# Patient Record
Sex: MALE | Race: WHITE | Employment: UNEMPLOYED | URBAN - NONMETROPOLITAN AREA
[De-identification: names, ages, dates, MRNs, and addresses within clinical notes are randomized per-mention and may not be internally consistent; named-entity substitution may affect disease eponyms.]

---

## 2017-01-01 ENCOUNTER — COMMUNICATION - GICH (OUTPATIENT)
Dept: INTERNAL MEDICINE | Facility: OTHER | Age: 62
End: 2017-01-01

## 2017-01-01 ENCOUNTER — AMBULATORY - GICH (OUTPATIENT)
Dept: SCHEDULING | Facility: OTHER | Age: 62
End: 2017-01-01

## 2017-01-01 ENCOUNTER — HOSPITAL - GICH (OUTPATIENT)
Dept: LAB | Facility: OTHER | Age: 62
End: 2017-01-01

## 2017-01-01 ENCOUNTER — ANTICOAGULATION - GICH (OUTPATIENT)
Dept: INTERNAL MEDICINE | Facility: OTHER | Age: 62
End: 2017-01-01

## 2017-01-01 ENCOUNTER — HISTORY (OUTPATIENT)
Dept: EMERGENCY MEDICINE | Facility: OTHER | Age: 62
End: 2017-01-01

## 2017-01-01 ENCOUNTER — HISTORY (OUTPATIENT)
Dept: INTERNAL MEDICINE | Facility: OTHER | Age: 62
End: 2017-01-01

## 2017-01-01 ENCOUNTER — AMBULATORY - GICH (OUTPATIENT)
Dept: INTERNAL MEDICINE | Facility: OTHER | Age: 62
End: 2017-01-01

## 2017-01-01 ENCOUNTER — OFFICE VISIT - GICH (OUTPATIENT)
Dept: INTERNAL MEDICINE | Facility: OTHER | Age: 62
End: 2017-01-01

## 2017-01-01 ENCOUNTER — AMBULATORY - GICH (OUTPATIENT)
Dept: GERIATRICS | Facility: OTHER | Age: 62
End: 2017-01-01

## 2017-01-01 DIAGNOSIS — Z79.01 LONG TERM CURRENT USE OF ANTICOAGULANT: ICD-10-CM

## 2017-01-01 DIAGNOSIS — D68.9 COAGULATION DEFECT (H): ICD-10-CM

## 2017-01-01 DIAGNOSIS — I48.20 CHRONIC ATRIAL FIBRILLATION (H): ICD-10-CM

## 2017-01-01 DIAGNOSIS — N20.0 CALCULUS OF KIDNEY: ICD-10-CM

## 2017-01-01 DIAGNOSIS — E78.5 HYPERLIPIDEMIA: ICD-10-CM

## 2017-01-01 DIAGNOSIS — Z79.4 LONG TERM CURRENT USE OF INSULIN (H): ICD-10-CM

## 2017-01-01 DIAGNOSIS — I48.0 PAROXYSMAL ATRIAL FIBRILLATION (H): ICD-10-CM

## 2017-01-01 DIAGNOSIS — R82.90 ABNORMAL FINDING IN URINE: ICD-10-CM

## 2017-01-01 DIAGNOSIS — J44.9 CHRONIC OBSTRUCTIVE PULMONARY DISEASE (H): ICD-10-CM

## 2017-01-01 DIAGNOSIS — I50.42 CHRONIC COMBINED SYSTOLIC AND DIASTOLIC HEART FAILURE (H): ICD-10-CM

## 2017-01-01 DIAGNOSIS — I10 ESSENTIAL (PRIMARY) HYPERTENSION: ICD-10-CM

## 2017-01-01 DIAGNOSIS — R53.1 WEAKNESS: ICD-10-CM

## 2017-01-01 DIAGNOSIS — G89.4 CHRONIC PAIN SYNDROME: ICD-10-CM

## 2017-01-01 DIAGNOSIS — D64.9 ANEMIA: ICD-10-CM

## 2017-01-01 DIAGNOSIS — E11.9 TYPE 2 DIABETES MELLITUS WITHOUT COMPLICATIONS (H): ICD-10-CM

## 2017-01-01 LAB
BACTERIA URINE: ABNORMAL BACTERIA/HPF
BILIRUB UR QL: NEGATIVE
CLARITY, URINE: ABNORMAL CLARITY
COLOR UR: YELLOW COLOR
EPITHELIAL CELLS: ABNORMAL EPI/HPF
ESTIMATED AVERAGE GLUCOSE: 255 MG/DL
GLUCOSE URINE: NEGATIVE MG/DL
HEMOGLOBIN A1C MONITORING (POCT) - HISTORICAL: 10.5 % (ref 4–6.2)
INR - HISTORICAL: 1
INR - HISTORICAL: 1.3
INR - HISTORICAL: 1.4
INR - HISTORICAL: 1.6
INR - HISTORICAL: 1.7
INR - HISTORICAL: 1.9
INR - HISTORICAL: 2.1
INR - HISTORICAL: 3.2
KETONES UR QL: NEGATIVE MG/DL
LEUKOCYTE ESTERASE URINE: ABNORMAL
NITRITE UR QL STRIP: NEGATIVE
OCCULT BLOOD,URINE - HISTORICAL: ABNORMAL
PH UR: 5 [PH]
PROTEIN QUALITATIVE,URINE - HISTORICAL: 100 MG/DL
PROTIME - HISTORICAL: 10.6 SEC (ref 11.9–15.2)
PROTIME - HISTORICAL: 13.4 SEC (ref 11.9–15.2)
PROTIME - HISTORICAL: 14.7 SEC (ref 11.9–15.2)
PROTIME - HISTORICAL: 17.2 SEC (ref 11.9–15.2)
PROTIME - HISTORICAL: 18 SEC (ref 11.9–15.2)
PROTIME - HISTORICAL: 20.3 SEC (ref 11.9–15.2)
PROTIME - HISTORICAL: 22.9 SEC (ref 11.9–15.2)
PROTIME - HISTORICAL: 35.7 SEC (ref 11.9–15.2)
RBC - HISTORICAL: ABNORMAL /HPF
SP GR UR STRIP: >=1.03
UROBILINOGEN,QUALITATIVE - HISTORICAL: NORMAL EU/DL
VITAMIN D TOTAL - HISTORICAL: 12 NG/ML
WBC - HISTORICAL: >100 /HPF
YEAST - HISTORICAL: ABNORMAL

## 2017-01-01 ASSESSMENT — PATIENT HEALTH QUESTIONNAIRE - PHQ9: SUM OF ALL RESPONSES TO PHQ QUESTIONS 1-9: 10

## 2017-01-01 ASSESSMENT — ANXIETY QUESTIONNAIRES
3. WORRYING TOO MUCH ABOUT DIFFERENT THINGS: SEVERAL DAYS
5. BEING SO RESTLESS THAT IT IS HARD TO SIT STILL: NOT AT ALL
GAD7 TOTAL SCORE: 2
7. FEELING AFRAID AS IF SOMETHING AWFUL MIGHT HAPPEN: NOT AT ALL
6. BECOMING EASILY ANNOYED OR IRRITABLE: SEVERAL DAYS
2. NOT BEING ABLE TO STOP OR CONTROL WORRYING: NOT AT ALL
1. FEELING NERVOUS, ANXIOUS, OR ON EDGE: NOT AT ALL
4. TROUBLE RELAXING: NOT AT ALL

## 2017-12-28 NOTE — TELEPHONE ENCOUNTER
Patient Information     Patient Name MRN Kole Lanad [id number] Male 1955      Telephone Encounter by Damari Keith RN at 2017  1:15 PM     Author:  Damari Keith RN Service:  (none) Author Type:  NURS- Registered Nurse     Filed:  2017  1:19 PM Encounter Date:  2017 Status:  Signed     :  Damari Keith RN (NURS- Registered Nurse)            Patient was just admitted to [facility] for rehab after being d/c from [facility] for COPD acute exacerbation. He is on warfarin for chronic afib. Patient assigned Dr Garcia who is not here today but we need orders for protime to manage patients INR's. Would you please review and sign orders. They are attached. Damari Keith RN    2017  1:18 PM

## 2017-12-28 NOTE — PROGRESS NOTES
Patient Information     Patient Name MRN Sex Kole Ortiz [id number] Male 1955      Progress Notes by Felecia Estrada NP at 2017  3:20 AM     Author:  Felecia Estrada NP Service:  (none) Author Type:  PHYS- Nurse Practitioner     Filed:  2017  7:27 AM Encounter Date:  2017 Status:  Signed     :  Felecia Estrada NP (PHYS- Nurse Practitioner)            This note has been dictated. The encounter number is [id number].

## 2017-12-28 NOTE — PATIENT INSTRUCTIONS
Patient Information     Patient Name MRN Sex Kole Ortiz [id number] Male 1955      Patient Instructions by Damari Keith RN at 2017  9:13 AM     Author:  Damari Keith RN Service:  (none) Author Type:  NURS- Registered Nurse     Filed:  2017 12:33 PM Encounter Date:  2017 Status:  Signed     :  Damari Keith RN (NURS- Registered Nurse)            2017 Details    Sun  Thu Fri Sat           1                 2               3               4               5               6               7               8                 9               10               11               12               13      6 mg   See details      14      4 mg         15      6 mg           16      6 mg         17      4 mg         18      6 mg         19      6 mg         20               21               22                 23               24               25               26               27               28               29                 30               31                     Date Details    This INR check       Date of next INR:  2017         How to take your warfarin dose     To take:  4 mg Take two of the 2 mg tablets.    To take:  6 mg Take three of the 2 mg tablets.             Description          Take 6 mg x 4 days and 4 mg x 2 days and recheck on 17. Damari Keith RN    2017  12:31 PM          Anticoagulation Summary as of 2017     INR goal 2.0-3.0    Today's INR 1.9!    Next INR check 2017          Call your Anticoagulation Clinic at Dept: [phone]   if:   1. Any medications are started, stopped, or there is a change in dose.  2. You experience any bleeding that is not easily stopped or if it is recurrent.  3. You notice an increase in bruising or any bruising that does not heal.  4. You are scheduled for surgery, colonoscopy, dental extraction or any other procedure where you may need to stop your Coumadin (warfarin).  Patient not  here, Protime Communication sheet with screening questions and current INR received via FAX from outside agency. Results reviewed, Warfarin dosing per protocol, and recommended follow-up appointment made. Paperwork FAXED back to facility.

## 2017-12-28 NOTE — PATIENT INSTRUCTIONS
Patient Information     Patient Name MRN Kole Landa [id number] Male 1955      Patient Instructions by Erica Sandra RN at 2017 10:53 AM     Author:  Erica Sandra RN Service:  (none) Author Type:  NURS- Registered Nurse     Filed:  2017 11:00 AM Encounter Date:  2017 Status:  Signed     :  Erica Sandra RN (NURS- Registered Nurse)            2017 Details    Sun  Sat         1               2               3                 4               5               6               7               8               9               10                 11               12               13               14               15               16               17                 18               19               20               21               22               23               24                 25               26      3 mg   See details      27      4 mg         28      3 mg         29      4 mg         30                 Date Details    This INR check       Date of next INR:  2017         How to take your warfarin dose     To take:  3 mg Take one and a half of the 2 mg tablets.    To take:  4 mg Take two of the 2 mg tablets.             Description          Take 3 mg Monday and Wednesday and 4 mg Tuesday and Thursday.  Recheck on 17.  Erica Sandra RN 2017 10:58 AM             Anticoagulation Summary as of 2017     INR goal 2.0-3.0    Today's INR 1.4!    Next INR check 2017          Call your Anticoagulation Clinic at Dept: [phone]   if:   1. Any medications are started, stopped, or there is a change in dose.  2. You experience any bleeding that is not easily stopped or if it is recurrent.  3. You notice an increase in bruising or any bruising that does not heal.  4. You are scheduled for surgery, colonoscopy, dental extraction or any other procedure where you may need to stop your Coumadin (warfarin).  Patient  not here, Protime Communication sheet with screening questions and current INR received via FAX from outside agency. Results reviewed, Warfarin dosing per protocol, and recommended follow-up appointment made. Paperwork FAXED back to facility.

## 2017-12-28 NOTE — TELEPHONE ENCOUNTER
Patient Information     Patient Name MRN Sex Kole Ortiz [id number] Male 1955      Telephone Encounter by Jillian Foster at 2017  1:45 PM     Author:  Jillian Foster Service:  (none) Author Type:  (none)     Filed:  2017  1:47 PM Encounter Date:  2017 Status:  Signed     :  Jillian Foster            Spoke to the nurse at [facility] regarding the insurance requirements for DME orders.  They are aware of this and will let the patient know he has to be seen and evaluated for this; nothing can be done over the phone.  Jillian Foster CMA (AAMA)................ 2017 1:46 PM

## 2017-12-28 NOTE — PROGRESS NOTES
Patient Information     Patient Name MRN Sex Kole Ortiz [id number] Male 1955      Progress Notes by Melany Garcia DO at 7/10/2017  1:40 PM     Author:  Melany Garcia DO Service:  (none) Author Type:  PHYS- Osteopathic     Filed:  2017  7:40 AM Encounter Date:  7/10/2017 Status:  Signed     :  Melany Garcia DO (PHYS- Osteopathic)            Please see H&P from same date.

## 2017-12-29 NOTE — PATIENT INSTRUCTIONS
Patient Information     Patient Name MRN Kole Landa [id number] Male 1955      Patient Instructions by Damari Keith RN at 7/3/2017  8:58 AM     Author:  Damari Keith RN Service:  (none) Author Type:  NURS- Registered Nurse     Filed:  7/3/2017 10:31 AM Encounter Date:  7/3/2017 Status:  Signed     :  Damari Keith RN (NURS- Registered Nurse)            2017 Details    Sun Mon e Wed Thu Fri Sat           1                 2               3      6 mg   See details      4      6 mg         5      4 mg         6               7               8                 9               10               11               12               13               14               15                 16               17               18               19               20               21               22                 23               24               25               26               27               28               29                 30               31                     Date Details    This INR check       Date of next INR:  2017         How to take your warfarin dose     To take:  4 mg Take two of the 2 mg tablets.    To take:  6 mg Take three of the 2 mg tablets.             Description          Take 6 mg x 2 days and 4 mg x 1 day recheck on 17. Damari Keith RN    7/3/2017  10:25 AM               Anticoagulation Summary as of 7/3/2017     INR goal 2.0-3.0    Today's INR 1.3!    Next INR check 2017          Call your Anticoagulation Clinic at Dept: [phone]   if:   1. Any medications are started, stopped, or there is a change in dose.  2. You experience any bleeding that is not easily stopped or if it is recurrent.  3. You notice an increase in bruising or any bruising that does not heal.  4. You are scheduled for surgery, colonoscopy, dental extraction or any other procedure where you may need to stop your Coumadin (warfarin).  Patient not here, Protime  Communication sheet with screening questions and current INR received via FAX from outside agency. Results reviewed, Warfarin dosing per protocol, and recommended follow-up appointment made. Paperwork FAXED back to facility.

## 2017-12-29 NOTE — PATIENT INSTRUCTIONS
Patient Information     Patient Name MRN Sex Kole Ortiz [id number] Male 1955      Patient Instructions by Aleyda Goins RN at 2017 12:13 PM     Author:  Aleyda Goins RN Service:  (none) Author Type:  NURS- Registered Nurse     Filed:  2017  3:52 PM Encounter Date:  2017 Status:  Signed     :  Aleyda Goins RN (NURS- Registered Nurse)            2017 Details    Sun  Thu Fri Sat           1                 2               3               4               5               6      6 mg   See details      7      4 mg         8      6 mg           9      4 mg         10               11               12               13               14               15                 16               17               18               19               20               21               22                 23               24               25               26               27               28               29                 30               31                     Date Details    This INR check       Date of next INR:  7/10/2017         How to take your warfarin dose     To take:  4 mg Take two of the 2 mg tablets.    To take:  6 mg Take three of the 2 mg tablets.             Description          Take 6 mg x two days and 4 mg x two days recheck on 7/10/17.  ALEYDA GOINS RN ....................  2017   3:42 PM          Anticoagulation Summary as of 2017     INR goal 2.0-3.0    Today's INR 1.7!    Next INR check 7/10/2017          Call your Anticoagulation Clinic at Dept: [phone]   if:   1. Any medications are started, stopped, or there is a change in dose.  2. You experience any bleeding that is not easily stopped or if it is recurrent.  3. You notice an increase in bruising or any bruising that does not heal.  4. You are scheduled for surgery, colonoscopy, dental extraction or any other procedure where you may need to stop your Coumadin (warfarin).  Patient  not here, Protime Communication sheet with screening questions and current INR received via FAX from outside agency. Results reviewed, Warfarin dosing per protocol, and recommended follow-up appointment made. Paperwork FAXED back to facility.

## 2017-12-29 NOTE — PATIENT INSTRUCTIONS
Patient Information     Patient Name MRN Sex Kole Ortiz [id number] Male 1955      Patient Instructions by Damari Keith RN at 7/10/2017 10:06 AM     Author:  Damari Keith RN Service:  (none) Author Type:  NURS- Registered Nurse     Filed:  7/10/2017 10:21 AM Encounter Date:  7/10/2017 Status:  Signed     :  Damari Keith RN (NURS- Registered Nurse)            2017 Details    Sun u Fri Sat           1                 2               3               4               5               6               7               8                 9               10      6 mg   See details      11      6 mg         12      6 mg         13               14               15                 16               17               18               19               20               21               22                 23               24               25               26               27               28               29                 30               31                     Date Details   07/10 This INR check       Date of next INR:  2017         How to take your warfarin dose     To take:  6 mg Take three of the 2 mg tablets.             Description          Take 6 mg x 3 days  And recheck on 17. Damari Keith RN    7/10/2017  10:20 AM          Anticoagulation Summary as of 7/10/2017     INR goal 2.0-3.0    Today's INR 1.6!    Next INR check 2017          Call your Anticoagulation Clinic at Dept: [phone]   if:   1. Any medications are started, stopped, or there is a change in dose.  2. You experience any bleeding that is not easily stopped or if it is recurrent.  3. You notice an increase in bruising or any bruising that does not heal.  4. You are scheduled for surgery, colonoscopy, dental extraction or any other procedure where you may need to stop your Coumadin (warfarin).  Patient not here, Protime Communication sheet with screening questions and current INR  received via FAX from outside agency. Results reviewed, Warfarin dosing per protocol, and recommended follow-up appointment made. Paperwork FAXED back to facility.

## 2017-12-29 NOTE — PATIENT INSTRUCTIONS
Patient Information     Patient Name MRN Sex Kole Ortiz [id number] Male 1955      Patient Instructions by Damari Keith RN at 2017  3:07 PM     Author:  Damari Keith RN Service:  (none) Author Type:  NURS- Registered Nurse     Filed:  2017  3:13 PM Encounter Date:  2017 Status:  Signed     :  Damari Keith RN (NURS- Registered Nurse)            2017 Details    Sun  Sat         1               2               3                 4               5               6               7               8               9               10                 11               12               13               14               15               16               17                 18               19      2 mg   See details      20      4 mg         21               22               23               24                 25               26               27               28               29               30                 Date Details    This INR check       Date of next INR:  2017         How to take your warfarin dose     To take:  2 mg Take one of the 2 mg tablets.    To take:  4 mg Take two of the 2 mg tablets.             Description          Take 2 mg today and 4 mg tomorrow then recheck on Wednesday. Damari Keith RN    2017  3:09 PM          Anticoagulation Summary as of 2017     INR goal 2.0-3.0    Today's INR 3.2!    Next INR check 2017          Call your Anticoagulation Clinic at Dept: [phone]   if:   1. Any medications are started, stopped, or there is a change in dose.  2. You experience any bleeding that is not easily stopped or if it is recurrent.  3. You notice an increase in bruising or any bruising that does not heal.  4. You are scheduled for surgery, colonoscopy, dental extraction or any other procedure where you may need to stop your Coumadin (warfarin).  Patient not here, Protime Communication sheet with screening  questions and current INR received via FAX from outside agency. Results reviewed, Warfarin dosing per protocol, and recommended follow-up appointment made. Paperwork FAXED back to facility.

## 2017-12-29 NOTE — H&P
Patient Information     Patient Name MRN Kole Landa [id number] Male 1955      H&P signed by Felecia Estrada NP at 2017  7:17 AM      Author:  Felecia Estrada NP Service:  (none) Author Type:  PHYS- Nurse Practitioner     Filed:  2017  7:17 AM Encounter Date:  2017 Status:  Signed     :  Felecia Estrada NP (PHYS- Nurse Practitioner)            -  DATE OF SERVICE:  2017    SANDHYA MCKEON     CHIEF COMPLAINT   Initial nurse practitioner evaluation for hyperglycemia, COPD, and diabetic peripheral neuropathy.     HISTORY OF PRESENT ILLNESS  Patient was hospitalized twice in the past month at [facility]. He had been living at [facility]. Both times he was admitted with acute-on-chronic respiratory failure secondary to COPD exacerbation. His COPD is severe. He has discontinued smoking and is using a nicotine patch. He is oxygen dependent, on 2 liters per minute. His O2 sats have been greater than 90%. He states his breathing is fine. He does report his blood sugars are running high. He does not at all follow a diabetic diet. He eats cakes, cookies, and drinks pop during the day. His sugars are uncontrolled. He is requesting to have sliding-scale insulin and an increase of his Lantus dose. He states he is normally on Lantus 60 units twice daily when he was at the other nursing home, but now is on 60 units in the morning and 50 units in the afternoon. His blood sugars are only being checked twice daily. Blood sugar readings are as follows: 7:30 a.m., 115 to 256; 7:30 p.m., 393 to 482.     He also has atrial fibrillation and is on warfarin, beta blocker, and calcium channel blocker. His rate is controlled.     Diabetic peripheral neuropathy. The patient states this is poorly controlled. He has been on Cymbalta, Lyrica, and gabapentin. He states all of these cause swelling in his legs. He is now on oxycodone 10 mg 1 tablet 3 times daily as  needed and has been taking this between 1 and 3 times daily, but most days has only taken this once or twice. He states it is not controlling his pain. When he was at home he was on oxycodone 20 mg 3 times daily. He has been refusing gabapentin since he has been at the nursing home. This was addressed in his [facility] notes. He reports to me that his primary doctor is Dr. Uriarte at [facility]. He was a previous prescriber. During the [facility] hospitalization they did not feel that any of his respiratory failure was related to narcotic use.     Past medical history, allergies, and medications reviewed. Hospital records, labs, and diagnostic studies all reviewed and discussed with patient.     PHYSICAL EXAMINATION  GENERAL: Pleasant, morbidly obese gentleman, in no acute distress. He is wearing his oxygen.   VITAL SIGNS: Blood pressures average from 124/50 to 150/91, pulse from 73 to 102, respiratory rate 18 to 20.   SKIN: Color pink. Mucous membranes moist.   LUNGS: Lung fields with rhonchi throughout.   CARDIOVASCULAR: Irregularly irregular with a controlled rate.   ABDOMEN: Soft and obese, without masses, tenderness, or organomegaly.   EXTREMITIES: Chronic rubor, 1+ edema. No ulcers.     LABORATORY   Labs as of 06/03/2017 at hospital discharge: WBC 7.1, hemoglobin 16.2, hematocrit 47.7, platelet low 109,000. Basic metabolic panel: Sodium 135, potassium 4.5, chloride 95, CO2 of 35, BUN 26, creatinine 0.9, blood sugar 532.     ASSESSMENT  1. COPD, severe.   2. Acute-on-chronic respiratory failure.   3. Type 2 diabetes, poorly controlled.   4. Atrial fibrillation.   5. Hyperlipidemia.   6. BPH.   7. Diabetic peripheral neuropathy.   8. Chronic pain.   9. Tobacco addiction.     PLAN  Increase the Lantus to 60 units twice daily. Check sugars 4 times daily rather than twice daily. Start sliding-scale insulin as follows: 200-250, give 2 units; 251-300, give 4 units; 301-350, give 6 units;  greater than 351, 8 units. On next lab day we will check an A1c. He will continue with the nicotine patch.     I have encouraged him to take the oxycodone 10 mg 3 times daily, which he was prescribed, rather than not taking it at all. We will discontinue the gabapentin since he is refusing it. I explained to patient that I will request notes from Dr. Uriarte's office to determine his dose prior to admission to this nursing home and if there is any reason that he is now on a lower dose than previous.     URIEL ELLIOTT    RV/cd   D:  2017 11:59:23  T:  2017 12:17:21  Voice Job ID:  [id number]  Text Job ID:  [id number]  cc:NURSING HOME  MANDIE SHIRLEY DO, PRIMARY PHYSICIAN         [facility] [place], [place]:  CONCEPCIÓN COLLIER  MR#:  [id number]  :  1955  DATE:  2017  LOCATION:  [facility]  ROOM:    TYPE:  CLINURSING HOME REPORTPage 1 of 1

## 2017-12-29 NOTE — PATIENT INSTRUCTIONS
Patient Information     Patient Name MRN Kole Landa [id number] Male 1955      Patient Instructions by Damari Keith RN at 2017 11:44 AM     Author:  Damari Keith RN Service:  (none) Author Type:  NURS- Registered Nurse     Filed:  2017 12:37 PM Encounter Date:  2017 Status:  Signed     :  Damari Keith RN (NURS- Registered Nurse)            2017 Details    Sun Mon Tue Wed Thu Fri Sat         1               2               3                 4               5               6               7               8               9               10                 11               12               13               14               15               16               17                 18               19               20               21               22               23               24                 25               26               27               28               29               30      4 mg   See details        Date Details    This INR check               How to take your warfarin dose     To take:  4 mg Take two of the 2 mg tablets.           2017 Details    Sun Mon Tue Wed Thu Fri Sat           1      4 mg           2      4 mg         3               4               5               6               7               8                 9               10               11               12               13               14               15                 16               17               18               19               20               21               22                 23               24               25               26               27               28               29                 30               31                     Date Details   No additional details    Date of next INR:  7/3/2017         How to take your warfarin dose     To take:  4 mg Take two of the 2 mg tablets.             Description          Take 4 mg x 3 days and recheck on  7/3/17. Damari Keith RN    6/30/2017  12:36 PM               Anticoagulation Summary as of 6/30/2017     INR goal 2.0-3.0    Today's INR 1.0!    Next INR check 7/3/2017          Call your Anticoagulation Clinic at Dept: [phone]   if:   1. Any medications are started, stopped, or there is a change in dose.  2. You experience any bleeding that is not easily stopped or if it is recurrent.  3. You notice an increase in bruising or any bruising that does not heal.  4. You are scheduled for surgery, colonoscopy, dental extraction or any other procedure where you may need to stop your Coumadin (warfarin).  Patient not here, Protime Communication sheet with screening questions and current INR received via FAX from outside agency. Results reviewed, Warfarin dosing per protocol, and recommended follow-up appointment made. Paperwork FAXED back to facility.

## 2017-12-29 NOTE — PATIENT INSTRUCTIONS
Patient Information     Patient Name MRN Sex Kole Ortiz [id number] Male 1955      Patient Instructions by Aleyda Goins RN at 2017 10:45 AM     Author:  Aleyda Goins RN Service:  (none) Author Type:  NURS- Registered Nurse     Filed:  2017 10:58 AM Encounter Date:  2017 Status:  Signed     :  Aleyda Goins RN (NURS- Registered Nurse)            2017 Details    Sun  Fri Sat         1               2               3                 4               5               6               7               8               9               10                 11               12               13               14               15               16               17                 18               19               20               21      2 mg   See details      22      4 mg         23      2 mg         24      4 mg           25      2 mg         26               27               28               29               30                 Date Details    This INR check       Date of next INR:  2017         How to take your warfarin dose     To take:  2 mg Take one of the 2 mg tablets.    To take:  4 mg Take two of the 2 mg tablets.             Description          Take 2 mg x three days, 4 mg x two days, recheck INR 17.  ALEYDA GOINS RN ....................  2017   10:56 AM            Anticoagulation Summary as of 2017     INR goal 2.0-3.0    Today's INR 2.1    Next INR check 2017          Call your Anticoagulation Clinic at Dept: [phone]   if:   1. Any medications are started, stopped, or there is a change in dose.  2. You experience any bleeding that is not easily stopped or if it is recurrent.  3. You notice an increase in bruising or any bruising that does not heal.  4. You are scheduled for surgery, colonoscopy, dental extraction or any other procedure where you may need to stop your Coumadin (warfarin).  Patient not here,  [facility] Communication sheet with screening questions and current INR received via FAX from outside agency. Results reviewed, Warfarin dosing per protocol, and recommended follow-up appointment made. Paperwork FAXED back to facility.

## 2017-12-29 NOTE — H&P
Patient Information     Patient Name MRN Kole Landa [id number] Male 1955      H&P by Melany Garcia DO at 7/10/2017  1:40 PM     Author:  Melany Garcia DO Service:  (none) Author Type:  PHYS- Osteopathic     Filed:  2017  7:40 AM Encounter Date:  7/10/2017 Status:  Signed     :  Melany Garcia DO (PHYS- Osteopathic)            Chief Complaint     Patient presents with       [facility]      with Dr. Garcia, Nursing [place] Recertification       HPI: Mr. Diaz is a 61 y.o. male who presents today to [facility] and for 30 day nursing home recertification.    His biggest concern today is regarding his pain medications.  He mostly only talk about this and is not interested in reviewing his history.  He reports that he was previously on 20 mg of oxycodone 4 times a day.  When he was admitted to the nursing home this was changed to 10 mg 3 times a day due to lack of records and concern for high dose.  He has continued on this since.  According to the medication administration record he typically only takes this in the morning and midday.  He is not taking a third dose daily.  He tells me today that his biggest concern with the lower dose as that it does not take effect as fast as the 20 mg dose.  He reports that he has several areas of pain including his back, hips, arms and legs.    He was recently seen in the ER for kidney stone.  He did have some significant back pain with that.  This has improved at this time.    At this time, minimal records are available.  I do have a discharge summary from his recent hospitalization.  According to this he was admitted for acute on chronic respiratory failure.  It was felt that he had a COPD exacerbation.  He was smoking up to this hospitalization.  He has now been on nicotine patches.  No records are available prior to this hospitalization.  Patient is unable to give me any additional history.  Any additional information was taken from small  amount of old records.  We will need to obtain outside records from his primary care provider.    He does have type 2 diabetes with peripheral neuropathy.  This is overall poorly controlled.  He is currently on insulin glargine and NovoLog at this time.  Doses of this have been titrated up while he has been at the nursing home.  They are checking his blood sugars 4 times a day.  He also gets a sliding scale.  He did have an A1c checked and it was 10.5%.  He does not follow a diabetic diet.    He did also has several other comorbid conditions including chronic A. fib with congestive heart failure.  He is on diltiazem daily.  He is currently on diuretics for this.  He also has known COPD as above.  He is on several inhalers and nebulizers for this.  He does have high blood pressure and is currently on lisinopril.  He is on both tamsulosin and Doxazosin at this time.  He does have hyperlipidemia and is currently on a statin.    Vitals from the nursing home are reviewed at this time.  His blood pressure has been well controlled and approximately 125/65 on average.  His blood sugars have been good and even slightly low fasting with sugars ranging between 58 and 112.  His sugars throughout the day have been slightly higher ranging up to 300.  Oxygen has been 93-94% on 2 L/m nasal cannula.  Despite not taking his pain meds as above he does rate his pain at 8 or 9 regularly.  His heart rate has ranged from 70 up to 85.  His weight has been stable overall.    History is discussed on 7/10/2017 with patient and reviewed in previous available records by myself.  It is current to the best of my knowledge as below.    Past Medical History:     Diagnosis  Date     Anticoagulation monitoring, INR range 2-3 [Z79.01] 6/19/2017     Chronic a-fib (HC) 6/19/2017     Chronic combined systolic and diastolic congestive heart failure (HC) 7/14/2017     Chronic obstructive pulmonary disease (HC) 7/14/2017     Chronic pain syndrome 7/14/2017      Hyperlipidemia 7/14/2017     Hypertension 7/14/2017     Type 2 diabetes mellitus without complication, with long-term current use of insulin (HC) 7/14/2017        No past surgical history on file.  Unable to obtain from patient today given that he only wants to talk about pain meds.        Current Outpatient Prescriptions     Medication  Sig     acetaminophen (TYLENOL) 325 mg tablet Take  by mouth every 4 hours if needed. Max acetaminophen dose: 4000mg in 24 hrs. 2 tablets by mouth every 4 hours as needed for pain     acetylcysteine (MUCOMYST) 10 % (100 mg/ml) neb solution Inhale 4 mL by mouth every 4 hours. 4 ml inhale orally two times a day related to respiratory failure with hypoxia     albuterol HFA 90 mcg/actuation inhaler Inhale 2 Puffs by mouth 4 times daily if needed. 2 puffs inhale orally every 4 hours as needed     albuterol-ipratropium (DUONEB) (2.5-0.5 mg) in 3 mL NEBULIZATION solution Inhale 1 Neb via a nebulizer. 1 vial inhale     aspirin chewable 81 mg chewable tablet Take 81 mg by mouth once daily with a meal.     calcium carbonate 500 mg calcium (1,250 mg) chewable tablet Take 1,250 mg by mouth 3 times daily with meals.     cholecalciferol (VITAMIN D3) 50,000 unit capsule Take 50,000 Units by mouth every Monday and Thursday. Take one on Monday and Thursday for 6 weeks     diltiazem CD (CARDIZEM CD) 240 mg extended release 24 hr capsule Take 240 mg by mouth once daily.     doxazosin (CARDURA) 2 mg tablet Take 2 mg by mouth at bedtime.     fluticasone-salmeterol (ADVAIR) 500-50 mcg/Dose diskus inhaler Inhale 1 Puff by mouth every 12 hours.     furosemide (LASIX) 80 mg tablet Take 80 mg by mouth twice daily.     gabapentin (NEURONTIN) 300 mg capsule Take 300 mg by mouth 3 times daily.     GLUCAGON,HUMAN RECOMBINANT (GLUCAGON EMERGENCY KIT, HUMAN, INJ) by Injection route. As needed for hypoglycemia     guaiFENesin (MUCINEX) 600 mg Extended-Release tablet Take 600 mg by mouth 2 times daily if needed  for Expectoration. Give one tablet by mouth two times a day for cough/cold     insulin aspart (NOVOLOG) 100 unit/mL solution for injection Inject  subcutaneous 3 times daily before meals. Per sliding scale     insulin glargine (LANTUS SOLOSTAR PEN; BASAGLAR KWIKPEN) 100 unit/mL (3 mL) pen Inject  subcutaneous before bedtime. Inject 60 units SQ two times a day related to type 2 diabetes mellitus     Lactobacillus acidophilus 500 million cell cap Take  by mouth 3 times daily.     lisinopril (PRINIVIL; ZESTRIL) 40 mg tablet Take 40 mg by mouth once daily.     loperamide (IMODIUM) 2 mg capsule 4 times daily if needed for Diarrhea. Take 4mg by mouth with 1st loose stool, then 2mg with each subsequent loose stool. Max 16 mg in 24 hrs     metoprolol succinate (TOPROL XL) 50 mg sustained-release tablet Take 50 mg by mouth 2 times daily.     nicotine 21 mg/24 hr (NICODERM; HABITROL) 21 mg/24 hr patch Apply 1 Patch on dry, clean, hairless skin once daily.     nystatin-emollient combo no.54 100,000 unit/gram topical cream Apply  topically to affected area(s). Apply to affected areas topically two times a day for skin integrity     ondansetron (ZOFRAN, AS HYDROCHLORIDE,) 4 mg tablet Take 4 mg by mouth every 8 hours if needed for Nausea/Vomiting. Give 4mg by mouth every 6 hours as needed for nausea/vomiting     oxyCODONE 10 mg tablet Take 10 mg by mouth every 4 hours if needed  for Pain Give 10 mg by mouth every 6 hours as needed for pain  Give for pain rated 6-10     predniSONE (DELTASONE) 5 mg tablet Take 1 tablet by mouth once daily with a meal.     tamsulosin (FLOMAX) 0.4 mg capsule Take 0.4 mg by mouth once daily after a meal.     tiotropium (SPIRIVA) 18 mcg inhalation capsule Inhale 18 mcg by mouth once daily.     warfarin (COUMADIN) 2 mg tablet Take as directed by the [facility].     No current facility-administered medications for this visit.      Medications have been reviewed by me and are current to the best of my  "knowledge and ability.       Allergies      Allergen   Reactions     Salicylates  Other - Describe In Comment Field     Intolerant of aspirin, even 81 mg. Causes abdominal cramps         No family history on file.  Unable to obtain from patient today given his train of thoughts regarding pain meds.  Will review records.    No family status information on file.         Social History     Social History        Marital status:       Spouse name: N/A     Number of children:  N/A     Years of education:  N/A     Social History Main Topics        Smoking status:  Former Smoker     Quit date: 5/8/2017     Smokeless tobacco:  Never Used     Alcohol use  No     Drug use:  No     Sexual activity:  Not on file     Other Topics  Concern     Not on file      Social History Narrative     Currently living at [facility].            ROS  GEN: -fevers/-chills/-night sweats/+wt change - patient has gained significant weight since arriving at [facility].    NEURO: -headaches/-vision changes  EARS: -hearing changes/-tinnitus  NOSE: -drainage/-congestion  MOUTH/THROAT: - sore throat/-dysphagia/-sores  LUNGS: -sob/-cough  CARDIOVASCULAR: -cp/-palpitations  GI: + Occasional heartburn/-diarrhea/-constipation/-bloody stools  : -dysuria/-hematuria  ENDOCRINE: -skin or hair changes/-hot-cold intolerance  HEMATOLOGIC/LYMPHATIC: -swollen nodes/-easy bruising  SKIN: -rashes/-lesions  MSK/RHEUM: +joint pain/-swelling  NEURO: -weakness/-parasthesias  PSYCH: -anhedonia/+depression/-anxiety       EXAM:   /60  Pulse 68  Temp 97.7  F (36.5  C) (Tympanic)  Resp 20  Ht 1.803 m (5' 11\")  Wt 134.7 kg (297 lb)  SpO2 93%  BMI 41.42 kg/m2  Estimated body mass index is 41.42 kg/(m^2) as calculated from the following:    Height as of this encounter: 1.803 m (5' 11\").    Weight as of this encounter: 134.7 kg (297 lb).   GEN: Vitals reviewed.  Healthy appearing. Patient is in no acute distress. Cooperative with exam.  HEENT: " Normocephalic atraumatic.  Normal external eye, conjunctiva, lids, cornea with no scleral icterus or conjunctival erythema. Pupils equally round. Oropharynx with no erythema or exudates. Dentition adequate.    NECK: Supple; no thyromegaly or masses noted.  No cervical or supraclavicular lymphadenopathy.  CV: Heart irregularly irregular with normal rate and no murmur, sounds diminished throughout.  Radial and posterior tibial pulses palpable.  LUNGS: Lungs clear to auscultation bilaterally however severely diminished diffusely.  Chest rise equal bilaterally.  No accessory muscle use.  ABD:  Obese, Soft, nondistended.  No rebound. Bowel sounds positive.  SKIN: Warm and dry to touch.  No rash on face, arms and legs.  EXT: No clubbing or cyanosis.  1-2 plus bilateral lower extremity peripheral edema with venous stasis changes noted.  Bilateral boots in place due to his heel ulcers.  NEURO: Alert and oriented to person, place, and time.  Cranial nerves II-XII grossly intact with no focal or lateralizing deficits.  Muscle strength in the bilateral upper extremities is 4/5.  Muscle strength in right lower extremity is 3/5 and 4/5 on the left.  Gait is not tested as patient does have significant difficulty with any transfer.  No tremor. Sensation intact to light touch.  MSK: ROM of upper and lower ext symmetric and full.  PSYCH: Mood is good. Affect appropriate. Speech fluent. Answers questions appropriately and thought process normal.       ASSESSMENT AND PLAN:    Patient's total care plan was reviewed today.  History as available along with medications were all reviewed.  Changes made as below.    1. Chronic a-fib (HC)  - in A fib today  - rate controlled currently with diltiazem and metoprolol  - Anticoagulation with warfarin    2. Anemia, unspecified type  - etiology is unknown.  I will review records to determine if this has been present prior.  If not, I would recommend repeat in approximately 4-6 weeks.    3.  Generalized weakness  - he does need a referral for physical therapy to continue to work on his strengthening.  This is placed today.  He is encouraged to work with them overall.  - AMB CONSULT TO PHYSICAL THERAPY; Future    4. Chronic obstructive pulmonary disease, unspecified COPD type (HC)  - no indication of exacerbation at this time  - patient is on multiple inhalers and nebs, continue for now  - he has been on prednisone since his hospitalization.  We will taper that down from 10 mg to 5 mg daily.    - predniSONE (DELTASONE) 5 mg tablet; Take 1 tablet by mouth once daily with a meal.  Dispense: 14 tablet; Refill: 0    5. Kidney stone  - resolved at this time.    6. Chronic pain syndrome  - at this time he was informed that we will need to obtain outside records prior to prescribing any narcotics.  In reviewing the prescription monitoring system he was receiving 120 20 mg tablets every month prior to his hospitalization.      7. Hypertension  - Blood pressure today of 130/60  is at the goal of <140/90.  - Discontinue Doxil Zosyn at this time given the overlap with tamsulosin.  Instructed to check BP at home.  - Cautioned patient to monitor with antibiotics, herbals and any OTC medications    8. Type 2 diabetes mellitus without complication, with long-term current use of insulin (HC)  - at this time he was strongly encouraged to improve his diet.  He will continue on his current insulin with adjustments made as needed.  - He will need to monitor his blood sugars with the decrease in prednisone as this may require less insulin.    9. Chronic combined systolic and diastolic congestive heart failure (HC)  - controlled with no indication of exacerbation  - educated on salt intake, limit of 2-2.5 grams daily, use other herbs and pepper to season food, no salt shaker; limit intake of cheese, canned soups and vegetables.    - Record heart rate, weight and blood pressure at various times daily and bring with to your  next appt    10. Hyperlipidemia, unspecified hyperlipidemia type  - he will continue on atorvastatin nightly at this time.  We will reviewed old records regarding his last lipids.  - atorvastatin (LIPITOR) 40 mg tablet; Take 1 tablet by mouth at bedtime.; Refill: 0         MANDIE SHIRLEY DO   7/10/2017 2:33 PM    This document was prepared using voice generated softwear. While every attempt was made for accuracy, grammatical errors may exist.

## 2017-12-30 NOTE — NURSING NOTE
Patient Information     Patient Name MRN Sex Kole Ortiz [id number] Male 1955      Nursing Note by Erica Keene at 7/10/2017  1:40 PM     Author:  Erica Keene Service:  (none) Author Type:  (none)     Filed:  7/10/2017  1:48 PM Encounter Date:  7/10/2017 Status:  Signed     :  Erica Keene            Patient presents to clinic for establish care appointment with Melany Garcia DO.  He recently moved into [facility] and needs Nursing Home Recertification.  Also, patient was seen in the ER 17 for kidney stone.  Pain rated at 5 bilaterally feet.    Erica Keene LPN..................7/10/2017  1:43 PM

## 2018-01-28 VITALS
HEIGHT: 71 IN | SYSTOLIC BLOOD PRESSURE: 130 MMHG | BODY MASS INDEX: 41.58 KG/M2 | OXYGEN SATURATION: 93 % | WEIGHT: 297 LBS | DIASTOLIC BLOOD PRESSURE: 60 MMHG | RESPIRATION RATE: 20 BRPM | HEART RATE: 68 BPM | TEMPERATURE: 97.7 F

## 2018-01-31 ASSESSMENT — ANXIETY QUESTIONNAIRES: GAD7 TOTAL SCORE: 2

## 2018-01-31 ASSESSMENT — PATIENT HEALTH QUESTIONNAIRE - PHQ9: SUM OF ALL RESPONSES TO PHQ QUESTIONS 1-9: 10

## 2018-02-01 ENCOUNTER — DOCUMENTATION ONLY (OUTPATIENT)
Dept: FAMILY MEDICINE | Facility: OTHER | Age: 63
End: 2018-02-01

## 2018-02-11 PROBLEM — Z79.01 LONG TERM (CURRENT) USE OF ANTICOAGULANTS: Status: ACTIVE | Noted: 2018-02-11

## 2018-02-11 PROBLEM — I48.2 CHRONIC ATRIAL FIBRILLATION: Status: ACTIVE | Noted: 2018-02-11
